# Patient Record
Sex: FEMALE | Race: OTHER | HISPANIC OR LATINO | Employment: FULL TIME | URBAN - METROPOLITAN AREA
[De-identification: names, ages, dates, MRNs, and addresses within clinical notes are randomized per-mention and may not be internally consistent; named-entity substitution may affect disease eponyms.]

---
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
## 2023-08-01 ENCOUNTER — HOSPITAL ENCOUNTER (EMERGENCY)
Facility: HOSPITAL | Age: 33
Discharge: HOME | End: 2023-08-02
Attending: INTERNAL MEDICINE | Admitting: EMERGENCY MEDICINE
Payer: COMMERCIAL

## 2023-08-01 ENCOUNTER — APPOINTMENT (EMERGENCY)
Dept: CT IMAGING | Facility: HOSPITAL | Age: 33
End: 2023-08-01
Payer: COMMERCIAL

## 2023-08-01 DIAGNOSIS — N20.9: Primary | ICD-10-CM

## 2023-08-01 DIAGNOSIS — R10.9 FLANK PAIN: ICD-10-CM

## 2023-08-01 LAB
ALBUMIN SERPL BCP-MCNC: 4.8 G/DL (ref 3.5–5)
ALP SERPL-CCNC: 49 U/L (ref 34–104)
ALT SERPL W P-5'-P-CCNC: 22 U/L (ref 7–52)
ANION GAP SERPL CALCULATED.3IONS-SCNC: 9 MMOL/L
AST SERPL W P-5'-P-CCNC: 16 U/L (ref 13–39)
BASOPHILS # BLD AUTO: 0.09 THOUSANDS/ÂΜL (ref 0–0.1)
BASOPHILS NFR BLD AUTO: 1 % (ref 0–1)
BILIRUB SERPL-MCNC: 0.24 MG/DL (ref 0.2–1)
BILIRUB UR QL STRIP: NEGATIVE
BUN SERPL-MCNC: 22 MG/DL (ref 5–25)
CALCIUM SERPL-MCNC: 9.9 MG/DL (ref 8.4–10.2)
CHLORIDE SERPL-SCNC: 104 MMOL/L (ref 96–108)
CLARITY UR: CLEAR
CO2 SERPL-SCNC: 25 MMOL/L (ref 21–32)
COLOR UR: YELLOW
CREAT SERPL-MCNC: 1 MG/DL (ref 0.6–1.3)
EOSINOPHIL # BLD AUTO: 0.16 THOUSAND/ÂΜL (ref 0–0.61)
EOSINOPHIL NFR BLD AUTO: 1 % (ref 0–6)
ERYTHROCYTE [DISTWIDTH] IN BLOOD BY AUTOMATED COUNT: 15.6 % (ref 11.6–15.1)
EXT PREGNANCY TEST URINE: NEGATIVE
EXT. CONTROL: NORMAL
GFR SERPL CREATININE-BSD FRML MDRD: 73 ML/MIN/1.73SQ M
GLUCOSE SERPL-MCNC: 80 MG/DL (ref 65–140)
GLUCOSE UR STRIP-MCNC: NEGATIVE MG/DL
HCT VFR BLD AUTO: 35.7 % (ref 34.8–46.1)
HGB BLD-MCNC: 10.6 G/DL (ref 11.5–15.4)
HGB UR QL STRIP.AUTO: NEGATIVE
IMM GRANULOCYTES # BLD AUTO: 0.04 THOUSAND/UL (ref 0–0.2)
IMM GRANULOCYTES NFR BLD AUTO: 0 % (ref 0–2)
KETONES UR STRIP-MCNC: NEGATIVE MG/DL
LEUKOCYTE ESTERASE UR QL STRIP: NEGATIVE
LYMPHOCYTES # BLD AUTO: 4.75 THOUSANDS/ÂΜL (ref 0.6–4.47)
LYMPHOCYTES NFR BLD AUTO: 37 % (ref 14–44)
MCH RBC QN AUTO: 23.8 PG (ref 26.8–34.3)
MCHC RBC AUTO-ENTMCNC: 29.7 G/DL (ref 31.4–37.4)
MCV RBC AUTO: 80 FL (ref 82–98)
MONOCYTES # BLD AUTO: 1.09 THOUSAND/ÂΜL (ref 0.17–1.22)
MONOCYTES NFR BLD AUTO: 8 % (ref 4–12)
NEUTROPHILS # BLD AUTO: 6.84 THOUSANDS/ÂΜL (ref 1.85–7.62)
NEUTS SEG NFR BLD AUTO: 53 % (ref 43–75)
NITRITE UR QL STRIP: NEGATIVE
NRBC BLD AUTO-RTO: 0 /100 WBCS
PH UR STRIP.AUTO: 5.5 [PH] (ref 4.5–8)
PLATELET # BLD AUTO: 302 THOUSANDS/UL (ref 149–390)
PMV BLD AUTO: 10.6 FL (ref 8.9–12.7)
POTASSIUM SERPL-SCNC: 4.1 MMOL/L (ref 3.5–5.3)
PROT SERPL-MCNC: 8.4 G/DL (ref 6.4–8.4)
PROT UR STRIP-MCNC: NEGATIVE MG/DL
RBC # BLD AUTO: 4.45 MILLION/UL (ref 3.81–5.12)
SODIUM SERPL-SCNC: 138 MMOL/L (ref 135–147)
SP GR UR STRIP.AUTO: 1.02 (ref 1–1.03)
UROBILINOGEN UR QL STRIP.AUTO: 0.2 E.U./DL
WBC # BLD AUTO: 12.97 THOUSAND/UL (ref 4.31–10.16)

## 2023-08-01 PROCEDURE — 81003 URINALYSIS AUTO W/O SCOPE: CPT

## 2023-08-01 PROCEDURE — 80053 COMPREHEN METABOLIC PANEL: CPT

## 2023-08-01 PROCEDURE — G1004 CDSM NDSC: HCPCS

## 2023-08-01 PROCEDURE — 74177 CT ABD & PELVIS W/CONTRAST: CPT

## 2023-08-01 PROCEDURE — 36415 COLL VENOUS BLD VENIPUNCTURE: CPT

## 2023-08-01 PROCEDURE — 99284 EMERGENCY DEPT VISIT MOD MDM: CPT

## 2023-08-01 PROCEDURE — 99284 EMERGENCY DEPT VISIT MOD MDM: CPT | Performed by: INTERNAL MEDICINE

## 2023-08-01 PROCEDURE — 96374 THER/PROPH/DIAG INJ IV PUSH: CPT

## 2023-08-01 PROCEDURE — 81025 URINE PREGNANCY TEST: CPT

## 2023-08-01 PROCEDURE — 85025 COMPLETE CBC W/AUTO DIFF WBC: CPT

## 2023-08-01 RX ORDER — KETOROLAC TROMETHAMINE 30 MG/ML
15 INJECTION, SOLUTION INTRAMUSCULAR; INTRAVENOUS ONCE
Status: COMPLETED | OUTPATIENT
Start: 2023-08-01 | End: 2023-08-01

## 2023-08-01 RX ADMIN — KETOROLAC TROMETHAMINE 15 MG: 30 INJECTION, SOLUTION INTRAMUSCULAR; INTRAVENOUS at 19:04

## 2023-08-01 RX ADMIN — IOHEXOL 100 ML: 350 INJECTION, SOLUTION INTRAVENOUS at 20:32

## 2023-08-01 NOTE — ED ATTENDING ATTESTATION
8/1/2023  I, Karlyne Burkitt, MD, saw and evaluated the patient. I have discussed the patient with the resident/non-physician practitioner and agree with the resident's/non-physician practitioner's findings, Plan of Care, and MDM as documented in the resident's/non-physician practitioner's note, except where noted. All available labs and Radiology studies were reviewed. I was present for key portions of any procedure(s) performed by the resident/non-physician practitioner and I was immediately available to provide assistance. At this point I agree with the current assessment done in the Emergency Department. I have conducted an independent evaluation of this patient a history and physical is as follows:    75-year-old woman with history hypertension presents to ED for left lower abdominal pain. She states radiates to the left flank. She had this pain 2 weeks ago and resolved restarted today. Associated with dysuria. No nausea, vomiting, diarrhea, melena or hematochezia. On exam the patient is awake, alert, and comfortable. Mucous membranes are moist.  Neck supple and nontender. The patient's heart is regular without murmurs, rubs, or gallops. Lungs are clear bilaterally with good air movement. Abdomen soft, no voluntary guarding, rebound or rigidity, TTP over LLQ. Moves all extremities. Patient neurologically nonfocal. No skin rashes. Back without deformities. Medical decision making: Differential diagnosis includes but not limited to: UTI, nephrolithiasis, pregnancy, ectopic pregnancy, viral syndrome, constipation, gerd, gastritis,  mesenteric ischemia, mesenteric adenitis, pancreatitis, cholecystitis, choledocholithiasis, hepatitis, bowel obstruction, ileus, gastroenteritis, colitis, malignancy, AAA, perforation, toxicologic poisoning, renal infarct, acute kidney injury, splenic infarct, splenic injury,muscular strain, intra-abdominal hematoma, hernia.   Patient will need UA, pregnancy test, blood work and CT abdomen pelvis.         ED Course  ED Course as of 08/01/23 1913   Tue Aug 01, 2023   1903 PREGNANCY TEST URINE: Negative   1903 Leukocytes, UA: Negative   1903 Nitrite, UA: Negative         Critical Care Time  Procedures

## 2023-08-01 NOTE — Clinical Note
Brittany Zhang was seen and treated in our emergency department on 8/1/2023. No restrictions            Diagnosis:     [facility]  . She may return on this date: 08/07/2023         If you have any questions or concerns, please don't hesitate to call.       Diane Navarro, DO    ______________________________           _______________          _______________  Hospital Representative                              Date                                Time

## 2023-08-01 NOTE — Clinical Note
Zoraida Joy was seen and treated in our emergency department on 8/1/2023. No restrictions            Diagnosis:     [place]  . She may return on this date: 08/04/2023         If you have any questions or concerns, please don't hesitate to call.       Lisa Horan, DO    ______________________________           _______________          _______________  Hospital Representative                              Date                                Time

## 2023-08-01 NOTE — Clinical Note
Sathya Shea was seen and treated in our emergency department on 8/1/2023. Diagnosis:     [facility]  . She may return on this date: If you have any questions or concerns, please don't hesitate to call.       Esther Craig MD    ______________________________           _______________          _______________  Hospital Representative                              Date                                Time

## 2023-08-01 NOTE — ED PROVIDER NOTES
History  Chief Complaint   Patient presents with   • Abdominal Pain     Lower abdominal pain that wraps around to her L lower back. Denies n/v/d. C/o urinary frequency and painful urination     78-year-old female patient with history of hypertension presenting with left lower abdominal pain radiating to left flank onset yesterday. Patient states that she had this pain 2 weeks ago that resolved. States pain came back. Patient states that pain worsens when she has to urinate and also has pain when she is urinating. Denies any fevers, chest pain, shortness of breath, nausea, vomiting, diarrhea. States does not think she is pregnant. Patient states that she took Tylenol without improvement of symptoms. Prior to Admission Medications   Prescriptions Last Dose Informant Patient Reported? Taking?   acetaminophen (TYLENOL) 325 mg tablet   No Yes   Sig: Take 2 tablets (650 mg total) by mouth every 6 (six) hours as needed for mild pain, headaches or fever   melatonin 3 mg Not Taking  No No   Sig: Take 1 tablet (3 mg total) by mouth daily at bedtime   Patient not taking: Reported on 2023   menthol-methyl salicylate (BENGAY) 66-66 % cream Not Taking  No No   Sig: Apply topically 4 (four) times a day as needed (back pain)   Patient not taking: Reported on 2023      Facility-Administered Medications: None       Past Medical History:   Diagnosis Date   • Hypertension        Past Surgical History:   Procedure Laterality Date   •  SECTION         History reviewed. No pertinent family history. I have reviewed and agree with the history as documented. E-Cigarette/Vaping     E-Cigarette/Vaping Substances     Social History     Tobacco Use   • Smoking status: Never   • Smokeless tobacco: Never   Substance Use Topics   • Alcohol use: Never   • Drug use: Never        Review of Systems   Gastrointestinal: Positive for abdominal pain. Genitourinary: Positive for dysuria and flank pain.    All other systems reviewed and are negative. Physical Exam  ED Triage Vitals   Temperature Pulse Respirations Blood Pressure SpO2   08/01/23 1821 08/01/23 1821 08/01/23 1821 08/01/23 1821 08/01/23 1821   98.2 °F (36.8 °C) 89 17 (!) 129/102 100 %      Temp Source Heart Rate Source Patient Position - Orthostatic VS BP Location FiO2 (%)   08/01/23 1821 08/01/23 1821 08/01/23 1821 08/01/23 1821 --   Oral Monitor Sitting Right arm       Pain Score       08/01/23 1904       8             Orthostatic Vital Signs  Vitals:    08/01/23 1821 08/01/23 1945 08/01/23 2225 08/02/23 0030   BP: (!) 129/102 137/85 134/92 116/74   Pulse: 89 86 80 70   Patient Position - Orthostatic VS: Sitting Lying Lying Lying       Physical Exam  Vitals reviewed. Constitutional:       Appearance: Normal appearance. HENT:      Head: Normocephalic and atraumatic. Nose: Nose normal.      Mouth/Throat:      Mouth: Mucous membranes are moist.      Pharynx: Oropharynx is clear. Eyes:      Extraocular Movements: Extraocular movements intact. Conjunctiva/sclera: Conjunctivae normal.   Cardiovascular:      Rate and Rhythm: Normal rate and regular rhythm. Pulses: Normal pulses. Heart sounds: Normal heart sounds. Pulmonary:      Effort: Pulmonary effort is normal.      Breath sounds: Normal breath sounds. Abdominal:      General: Bowel sounds are normal.      Palpations: Abdomen is soft. Tenderness: There is abdominal tenderness in the suprapubic area and left lower quadrant. Comments: Left flank pain   Musculoskeletal:         General: Normal range of motion. Cervical back: Normal range of motion. Skin:     General: Skin is warm and dry. Neurological:      General: No focal deficit present. Mental Status: She is alert and oriented to person, place, and time. Mental status is at baseline.          ED Medications  Medications   ketorolac (TORADOL) injection 15 mg (15 mg Intravenous Given 8/1/23 1904)   iohexol (OMNIPAQUE) 350 MG/ML injection (SINGLE-DOSE) 100 mL (100 mL Intravenous Given 8/1/23 2032)       Diagnostic Studies  Results Reviewed     Procedure Component Value Units Date/Time    Comprehensive metabolic panel [[id number]] Collected: 08/01/23 1905    Lab Status: Final result Specimen: Blood from Arm, Right Updated: 08/01/23 1928     Sodium 138 mmol/L      Potassium 4.1 mmol/L      Chloride 104 mmol/L      CO2 25 mmol/L      ANION GAP 9 mmol/L      BUN 22 mg/dL      Creatinine 1.00 mg/dL      Glucose 80 mg/dL      Calcium 9.9 mg/dL      AST 16 U/L      ALT 22 U/L      Alkaline Phosphatase 49 U/L      Total Protein 8.4 g/dL      Albumin 4.8 g/dL      Total Bilirubin 0.24 mg/dL      eGFR 73 ml/min/1.73sq m     Narrative:      [facility] guidelines for [facility] Kid[facility] Disease (CKD):   •  Stage 1 with normal or high GFR (GFR > 90 mL/min/1.73 square meters)  •  Stage 2 Mild CKD (GFR = 60-89 mL/min/1.73 square meters)  •  Stage 3A Moderate CKD (GFR = 45-59 mL/min/1.73 square meters)  •  Stage 3B Moderate CKD (GFR = 30-44 mL/min/1.73 square meters)  •  Stage 4 Severe CKD (GFR = 15-29 mL/min/1.73 square meters)  •  Stage 5 End Stage CKD (GFR <15 mL/min/1.73 square meters)  Note: GFR calculation is accurate only with a steady state creatinine    CBC and differential [[id number]]  (Abnormal) Collected: 08/01/23 1905    Lab Status: Final result Specimen: Blood from Arm, Right Updated: 08/01/23 1911     WBC 12.97 Thousand/uL      RBC 4.45 Million/uL      Hemoglobin 10.6 g/dL      Hematocrit 35.7 %      MCV 80 fL      MCH 23.8 pg      MCHC 29.7 g/dL      RDW 15.6 %      MPV 10.6 fL      Platelets 519 Thousands/uL      nRBC 0 /100 WBCs      Neutrophils Relative 53 %      Immat GRANS % 0 %      Lymphocytes Relative 37 %      Monocytes Relative 8 %      Eosinophils Relative 1 %      Basophils Relative 1 %      Neutrophils Absolute 6.84 Thousands/µL      Immature Grans Absolute 0.04 Thousand/uL Lymphocytes Absolute 4.75 Thousands/µL      Monocytes Absolute 1.09 Thousand/µL      Eosinophils Absolute 0.16 Thousand/µL      Basophils Absolute 0.09 Thousands/µL     POCT pregnancy, urine [[id number]]  (Normal) Resulted: 08/01/23 1902    Lab Status: Final result Updated: 08/01/23 1902     EXT Preg Test, Ur Negative     Control Valid    Urine Macroscopic, POC [[id number]] Collected: 08/01/23 1858    Lab Status: Final result Specimen: Urine Updated: 08/01/23 1900     Color, UA Yellow     Clarity, UA Clear     pH, UA 5.5     Leukocytes, UA Negative     Nitrite, UA Negative     Protein, UA Negative mg/dl      Glucose, UA Negative mg/dl      Ketones, UA Negative mg/dl      Urobilinogen, UA 0.2 E.U./dl      Bilirubin, UA Negative     Occult Blood, UA Negative     Specific Gravity, UA 1.025    Narrative:      CLINITEK RESULT                 CT abdomen pelvis with contrast   Final Result by Margarito Newell MD (08/02 6010)      Multiple phleboliths are present within the pelvis. Although the majority of these appear to be vascular, a tiny punctate distal ureteral calculus is difficult to entirely exclude. There is, however, no evidence of hydronephrosis bilaterally. Small hiatal hernia. No bowel obstruction. Normal appendix. There is a 5 mm peripherally oriented pulmonary nodule posteriorly within the right lower lobe of the lung. This was not definitely demonstrable on the July 11, 2021 examination. Based on current Fleischner Society 2017 Guidelines on incidental pulmonary    nodule, in this patient who is less than 39years of age, management decisions should be made on a case by case basis, keeping in mind that infectious causes are more likely than cancer and that use of serial CT should be minimized.          There is a significant additional finding or different interpretation from the [facility] preliminary report which was provided shortly after completion of the exam. The additional findings of right lower lobe pulmonary nodule and no    hydronephrosis will now be communicated with patient's clinical team by our radiology liaison. The study was marked in [facility] for immediate notification. Workstation performed: [id number]               Procedures  Procedures      ED Course                             SBIRT 20yo+    Flowsheet Row Most Recent Value   Initial Alcohol Screen: US AUDIT-C     1. How often do you have a drink containing alcohol? 0 Filed at: 08/01/2023 1853   2. How many drinks containing alcohol do you have on a typical day you are drinking? 0 Filed at: 08/01/2023 1853   3b. FEMALE Any Age, or MALE 65+: How often do you have 4 or more drinks on one occassion? 0 Filed at: 08/01/2023 1853   Audit-C Score 0 Filed at: 08/01/2023 1853   AN: How many times in the past year have you. .. Used an illegal drug or used a prescription medication for non-medical reasons? Never Filed at: 08/01/2023 1853                Medical Decision Making  38-year-old female patient presenting with left lower abdominal pain rating to left flank. States also having urinary symptoms. DDx includes pyelonephritis, cystitis, kidney stone. Labs show leukocytosis. Urine negative. CT abdomen pelvis pending. Patient treated with Toradol with improvement of symptoms. Signed out to Dr. Sascha Ohara pending CT read. Flank pain:     Details: Suspect kidney stones. Amount and/or Complexity of Data Reviewed  Labs: ordered. Radiology: ordered. Risk  Prescription drug management.             Disposition  Final diagnoses:   Urolith   Flank pain     Time reflects when diagnosis was documented in both MDM as applicable and the Disposition within this note     Time User Action Codes Description Comment    8/2/2023 12:29 AM Gwenevere Good Add [N20.9] Urolith     8/2/2023 12:29 AM Gwenevere Good Add [R10.9] Flank pain       ED Disposition     ED Disposition   Discharge    Condition   Stable    Date/Time   Wed Aug 2, 2023 12:30 AM    Comment   Zoraida Joy discharge to home/self care. Follow-up Information     Follow up With Specialties Details Why [id number] , PA-C Family Medicine Call in 2 days  [facility]  [address]  [phone]            Discharge Medication List as of 8/2/2023 12:51 AM      START taking these medications    Details   oxyCODONE (Roxicodone) 5 immediate release tablet Take 1 tablet (5 mg total) by mouth every 4 (four) hours as needed for moderate pain for up to 4 days Max Daily Amount: 30 mg, Starting Wed 8/2/2023, Until Sun 8/6/2023 at 2359, Normal         CONTINUE these medications which have NOT CHANGED    Details   acetaminophen (TYLENOL) 325 mg tablet Take 2 tablets (650 mg total) by mouth every 6 (six) hours as needed for mild pain, headaches or fever, Starting Wed 6/30/2021, Normal      melatonin 3 mg Take 1 tablet (3 mg total) by mouth daily at bedtime, Starting Sun 6/13/2021, Normal      menthol-methyl salicylate (BENGAY) 96-22 % cream Apply topically 4 (four) times a day as needed (back pain), Starting Sun 6/13/2021, Normal               PDMP Review     None           ED Provider  Attending physically available and evaluated Zoraida Joy. I managed the patient along with the ED Attending.     Electronically Signed by         Geraldyne Rubinstein, MD  08/02/23 6215

## 2023-08-02 VITALS
WEIGHT: 174.82 LBS | OXYGEN SATURATION: 99 % | RESPIRATION RATE: 18 BRPM | SYSTOLIC BLOOD PRESSURE: 116 MMHG | TEMPERATURE: 98.2 F | HEART RATE: 70 BPM | DIASTOLIC BLOOD PRESSURE: 74 MMHG

## 2023-08-02 RX ORDER — OXYCODONE HYDROCHLORIDE 5 MG/1
5 TABLET ORAL EVERY 4 HOURS PRN
Qty: 15 TABLET | Refills: 0 | Status: SHIPPED | OUTPATIENT
Start: 2023-08-02 | End: 2023-08-06

## 2023-08-02 NOTE — ED CARE HANDOFF
Emergency Department Sign Out Note        Sign out and transfer of care from Dr. Niru Ramsey. See Separate Emergency Department note. The patient, Jl Meredith, was evaluated by the previous provider for abdominal/flank pain. Workup Completed:  Labs urine CT    ED Course / Workup Pending (followup):  CT read                                   ED Course as of 08/02/23 0051   Tue Aug 01, 2023   2146 SO: L flank pain. CT pending. If normal can go. Uro f/u? Procedures  Medical Decision Making  CT read shows 2 mm ureterolith distal.  No other acute findings. Rest of patient's work-up unremarkable. Discussed results with patient. All questions answered. Set up for urology follow-up within the next week. We will send prescription for pain medication to the pharmacy. Return precautions given patient verbalized understanding    Amount and/or Complexity of Data Reviewed  Labs: ordered. Radiology: ordered. Risk  Prescription drug management. Disposition  Final diagnoses:   Urolith   Flank pain     Time reflects when diagnosis was documented in both MDM as applicable and the Disposition within this note     Time User Action Codes Description Comment    8/2/2023 12:29 AM Redia Apple Add [N20.9] Urolith     8/2/2023 12:29 AM Redia Apple Add [R10.9] Flank pain       ED Disposition     ED Disposition   Discharge    Condition   Stable    Date/Time   Wed Aug 2, 2023 12:30 AM    Comment   Jl Meredith discharge to home/self care.                Follow-up Information     Follow up With Specialties Details Why [id number] RICHARD Ar[facility] Family Medicine Call in 2 days  [address]  [facility] [address]          Patient's Medications   Discharge Prescriptions    OXYCODONE (ROXICODONE) 5 IMMEDIATE RELEASE TABLET    Take 1 tablet (5 mg total) by mouth every 4 (four) hours as needed for moderate pain for up to 4 days Max Daily Amount: 30 mg Start Date: 8/2/2023  End Date: 8/6/2023       Order Dose: 5 mg       Quantity: 15 tablet    Refills: 0            ED Provider  Electronically Signed by     Mark Gallegos DO  08/02/23 9498

## 2023-08-03 ENCOUNTER — OFFICE VISIT (OUTPATIENT)
Dept: FAMILY MEDICINE CLINIC | Facility: CLINIC | Age: 33
End: 2023-08-03

## 2023-08-03 VITALS
SYSTOLIC BLOOD PRESSURE: 120 MMHG | TEMPERATURE: 97.2 F | BODY MASS INDEX: 29.88 KG/M2 | WEIGHT: 175 LBS | RESPIRATION RATE: 16 BRPM | DIASTOLIC BLOOD PRESSURE: 70 MMHG | OXYGEN SATURATION: 99 % | HEIGHT: 64 IN | HEART RATE: 76 BPM

## 2023-08-03 DIAGNOSIS — R10.9 FLANK PAIN: ICD-10-CM

## 2023-08-03 DIAGNOSIS — R10.30 LOWER ABDOMINAL PAIN: Primary | ICD-10-CM

## 2023-08-03 DIAGNOSIS — R30.0 DYSURIA: ICD-10-CM

## 2023-08-03 DIAGNOSIS — R91.1 PULMONARY NODULE: ICD-10-CM

## 2023-08-03 PROCEDURE — 99213 OFFICE O/P EST LOW 20 MIN: CPT | Performed by: INTERNAL MEDICINE

## 2023-08-03 RX ORDER — PHENAZOPYRIDINE HYDROCHLORIDE 200 MG/1
200 TABLET, FILM COATED ORAL
Qty: 10 TABLET | Refills: 0 | Status: SHIPPED | OUTPATIENT
Start: 2023-08-03

## 2023-08-03 RX ORDER — IBUPROFEN 400 MG/1
600 TABLET ORAL EVERY 6 HOURS PRN
Qty: 30 TABLET | Refills: 1 | Status: SHIPPED | OUTPATIENT
Start: 2023-08-03

## 2023-08-03 NOTE — PATIENT INSTRUCTIONS
Pedras nos rins   O QUE VOCÊ PRECISA SABER:   Pedras nos rins se formam no sistema urinário quando há um desequilíbrio entre a quantidade de [place] e resíduos na sua urina. Quando isso acontece, certos tipos de cristais residuais se formam na urina. Os cristais se acumulam e formam pedras nos rins. Você pode ter mais de tammie pedra nos rins. INSTRUÇÕES APÓS A BUSTER:   Volte à emergência se:  Você estiver vomitando e não melhorar após ava medicamento. Ligue para o seu médico ou nefrologista se:  Você tiver febre. Você estiver com dificuldade para urinar. Você observar sangue na urina. Você sentir jaren intensa. Você tiver dúvidas ou preocupações relativas ao seu problema de saúde ou a whitney lidar com ed. Medicamentos: Você pode precisar de:  AINEs , whitney o ibuprofeno, ajudam a diminuir o inchaço, jaren e febre. Jes medicamento está disponível com ou bismark receita médica. Os AINEs podem provocar sangramento no estômago ou problemas renais em algumas pessoas. Se você estiver tomando um medicamento anticoagulante, sempre consulte o seu médico para saber se os SIRENA (medicamentos anti-inflamatórios não esteroides) são seguros para você. Sempre nini a bula do medicamento e siga as instruções. Acetaminofeno diminui jaren e febre. Ed está disponível bismark receita médica. Pergunte sobre a dose e a frequência com que deve ava o medicamento. Siga as instruções. Nini as bulas de todos os outros medicamentos que [employer] para hemanth se eles também contêm acetaminofeno ou consulte seu médico ou farmacêutico. O acetaminofeno pode causar danos ao fígado se não for tomado corretamente. Analgésicos de prescrição médica podem ser Mili Dunne. Pergunte ao seu médico whitney ava esse medicamento de forma [place]. Alguns analgésicos de prescrição contêm acetaminofeno.  Não tome outros medicamentos que contêm acetaminofeno (acetaminophen) bismark falar com seu médico. Acetaminofeno em excesso pode causar danos ao fígado. Analgésico de prescrição pode causar constipação. Pergunte ao [employer] prevenir ou tratar a constipação. Medicamentos para equilibrar seus eletrólitos podem ser necessários. [place] seu medicamento conforme orientado. Entre em contato com o seu médico se achar que o medicamento não está ajudando ou se você apresentar efeitos colaterais. Informe a ed se você for alérgico a qualquer medicamento. Mantenha tammie lista de todos os medicamentos, vitaminas e ervas (fitoterápicos) que você bonny. Inclua a quantidade, quando e por que os [place]. Leve a lista ou os frascos de comprimidos às consultas de acompanhamento. Leve sua lista de medicamentos consigo em kailee de emergência. O que você pode fazer para controlar pedras nos rins:  Elaine mais líquidos. Seu médico pode recomendar que você elaine pelo menos de oito a 12 xícaras (oito onças) de líquidos por josselin. Isso ajudará a eliminar as pedras nos rins pelo seu xixi. A água é o melhor líquido para beber. Filtre a urina sempre que for [employer]. Urine em tammie peneira ou um pedaço de pano patti para coletar as pedras. Leve as pedras ao seu médico para que possam ser enviadas ao laboratório para análise. Isso ajudará o médico a planejar o melhor tratamento para você. Pergunte se você deve evitar algum alimento. Pode ser necessário limitar o consumo de oxalatos. Oxalato é tammie substância química encontrada em alguns vegetais. O tipo mais comum de pedra nos rins é composto por cristais que contêm cálcio e oxalato. Se você tiver esse tipo de pedra nos rins com frequência, talvez seu médico ou nutricionista recomende que você limite o consumo de oxalatos. Pode ser necessário limitar a ingestão de sódio (sal) ou proteínas. Peça informações sobre os melhores alimentos para você. Pratique atividades físicas conforme orientado. As pedras podem passar mais facilmente se você permanecer ativo. As atividades físicas também podem ajudá-lo a controlar seu peso. Pergunte quais as melhores atividades para você. Após eliminar pedras nos rins: Seu médico pode  solicitar um teste de urina de 24 horas. Os resultados de um teste de urina de 24 horas ajudarão seu médico a planejar maneiras de prevenir a formação de mais pedras. Seu médico dará mais instruções. Faça acompanhamento com seu médico ou nefrologista conforme orientado: Anote as dúvidas que você tiver para se lembrar de perguntar sobre elas amarilis as consultas. © Copyright Nadeen Nix 2022 Information is for End User's use only and may not be sold, redistributed or otherwise used for commercial purposes. The above information is an  only. It is not intended as medical advice for individual conditions or treatments. Talk to your doctor, nurse or pharmacist before following any medical regimen to see if it is safe and effective for you.

## 2023-08-03 NOTE — ASSESSMENT & PLAN NOTE
5 mm peripherally oriented pulmonary nodule posteriorly within the right lower lobe of the lung noted on abdominal pelvis CT scan 08/01/2023.

## 2023-08-03 NOTE — ASSESSMENT & PLAN NOTE
Post ED follow-up visit for lower quadrant abdominal pain. Pain was initially intermittent but began constant and radiating to the lower back and left flank. Associated symptoms include dysuria. Patient was seen in the ED 8/1/2023 with similar complaints. Initial labs including UA, pregnancy test, CBC, CMP unremarkable. Abdominal CT concerning for tiny tiny punctate distal ureteral calculus and multiple phleboliths are present within the pelvis. Also turning fat-containing umbilical hernia noted on CT. Patient was treated with IV Toradol in the ED and discharged on oxycodone as needed. She reports moderate improvement and states the pain comes and go. Symptoms likely secondary to ureteric stone versus acute cystitis. Currently afebrile, and vitals within normal limits, PE notable for mild lower quadrant abdominal tenderness on palpation. Considering UA was negative in the ED we will continue further management with pain control.     Plan  -Encourage increase hydration  -Start Motrin 400 mg every 6 as needed  -Start Pyridium 200 mg 3 times daily  -Avoid all oxalic containing foods  -ED precautions discussed   -Follow-up with PCP as needed

## 2023-08-03 NOTE — PROGRESS NOTES
Name: Gabriela Castro      : 1990      MRN: [id number]  Encounter Provider: Erick June MD  Encounter Date: 8/3/2023   Encounter department: [address] [facility],[address]     1. Lower abdominal pain  Assessment & Plan:  Post ED follow-up visit for lower quadrant abdominal pain. Pain was initially intermittent but began constant and radiating to the lower back and left flank. Associated symptoms include dysuria. Patient was seen in the ED 2023 with similar complaints. Initial labs including UA, pregnancy test, CBC, CMP unremarkable. Abdominal CT concerning for tiny tiny punctate distal ureteral calculus and multiple phleboliths are present within the pelvis. Also turning fat-containing umbilical hernia noted on CT. Patient was treated with IV Toradol in the ED and discharged on oxycodone as needed. She reports moderate improvement and states the pain comes and go. Symptoms likely secondary to ureteric stone versus acute cystitis. Currently afebrile, and vitals within normal limits, PE notable for mild lower quadrant abdominal tenderness on palpation. Considering UA was negative in the ED we will continue further management with pain control. Plan  -Encourage increase hydration  -Start Motrin 400 mg every 6 as needed  -Start Pyridium 200 mg 3 times daily  -Avoid all oxalic containing foods  -ED precautions discussed   -Follow-up with PCP as needed        Orders:  -     ibuprofen (MOTRIN) 400 mg tablet; Take 1.5 tablets (600 mg total) by mouth every 6 (six) hours as needed for mild pain  -     phenazopyridine (PYRIDIUM) 200 mg tablet; Take 1 tablet (200 mg total) by mouth 3 (three) times a day with meals    2. Flank pain  -     ibuprofen (MOTRIN) 400 mg tablet; Take 1.5 tablets (600 mg total) by mouth every 6 (six) hours as needed for mild pain  -     phenazopyridine (PYRIDIUM) 200 mg tablet;  Take 1 tablet (200 mg total) by mouth 3 (three) times a day with meals    3. Dysuria  -     ibuprofen (MOTRIN) 400 mg tablet; Take 1.5 tablets (600 mg total) by mouth every 6 (six) hours as needed for mild pain  -     phenazopyridine (PYRIDIUM) 200 mg tablet; Take 1 tablet (200 mg total) by mouth 3 (three) times a day with meals    4. Pulmonary nodule  Assessment & Plan:  5 mm peripherally oriented pulmonary nodule posteriorly within the right lower lobe of the lung noted on abdominal pelvis CT scan 08/01/2023. BMI Counseling: Body mass index is 30.04 kg/m². The BMI is above normal. Nutrition recommendations include decreasing portion sizes, encouraging healthy choices of fruits and vegetables, decreasing fast food intake, consuming healthier snacks and limiting drinks that contain sugar. Exercise recommendations include moderate physical activity 150 minutes/week, vigorous physical activity 75 minutes/week and exercising 3-5 times per week. No pharmacotherapy was ordered. Patient referred to PCP. Rationale for BMI follow-up plan is due to patient being overweight or obese. Subjective     Patient is a 26years old female with a past medical history of hypertension who presents to the office for follow-up visit post ED for lower quadrant abdominal pain. She reports the pain has been present for more than 3 days. It was sudden onset, initially intermittent, sharp radiating to the lower back and left flank. Associated with dysuria. Initial labs in the ED including UA, pregnancy test, CBC CMP unremarkable. Abdominal CT concerning for tiny tiny punctate distal ureteral calculus and multiple phleboliths are present within the pelvis. Also turning fat-containing umbilical hernia noted on CT. A 5 mm peripherally oriented pulmonary nodule posteriorly within the right lower lobe of the lung. Denies fever, chills nausea/vomiting/hematuria. HPI  Review of Systems   Constitutional: Negative for chills and fever.    HENT: Negative for ear pain and sore throat. Eyes: Negative for pain and visual disturbance. Respiratory: Negative for cough and shortness of breath. Cardiovascular: Negative for chest pain and palpitations. Gastrointestinal: Negative for abdominal pain and vomiting. Genitourinary: Negative for dysuria and hematuria. Musculoskeletal: Negative for arthralgias and back pain. Skin: Negative for color change and rash. Neurological: Negative for seizures and syncope. Psychiatric/Behavioral: Negative for sleep disturbance and suicidal ideas. All other systems reviewed and are negative.       Current Outpatient Medications on File Prior to Visit   Medication Sig   • acetaminophen (TYLENOL) 325 mg tablet Take 2 tablets (650 mg total) by mouth every 6 (six) hours as needed for mild pain, headaches or fever   • melatonin 3 mg Take 1 tablet (3 mg total) by mouth daily at bedtime (Patient not taking: Reported on 8/1/2023)   • menthol-methyl salicylate (BENGAY) 11-70 % cream Apply topically 4 (four) times a day as needed (back pain) (Patient not taking: Reported on 8/1/2023)   • oxyCODONE (Roxicodone) 5 immediate release tablet Take 1 tablet (5 mg total) by mouth every 4 (four) hours as needed for moderate pain for up to 4 days Max Daily Amount: 30 mg      Social Connections: Not on file     Social History     Socioeconomic History   • Marital status: /Civil Union     Spouse name: Not on file   • Number of children: Not on file   • Years of education: Not on file   • Highest education level: Not on file   Occupational History   • Not on file   Tobacco Use   • Smoking status: Never   • Smokeless tobacco: Never   Substance and Sexual Activity   • Alcohol use: Never   • Drug use: Never   • Sexual activity: Not on file   Other Topics Concern   • Not on file   Social History Narrative   • Not on file     Social Determinants of Health     Financial Resource Strain: Not on file   Food Insecurity: Not on file   Transportation Needs: Not on file   Physical Activity: Not on file   Stress: Not on file   Social Connections: Not on file   Intimate Partner Violence: Not on file   Housing Stability: Not on file     No Known Allergies  History reviewed. No pertinent family history. Past Medical History:   Diagnosis Date   • Hypertension      Past Surgical History:   Procedure Laterality Date   •  SECTION         Objective     /70 (BP Location: Left arm, Patient Position: Sitting, Cuff Size: Standard)   Pulse 76   Temp (!) 97.2 °F (36.2 °C) (Temporal)   Resp 16   Ht 5' 4" (1.626 m)   Wt 79.4 kg (175 lb)   SpO2 99%   BMI 30.04 kg/m²     Physical Exam  Constitutional:       Appearance: Normal appearance. HENT:      Head: Normocephalic and atraumatic. Right Ear: External ear normal.      Left Ear: External ear normal.      Nose: No congestion. Mouth/Throat:      Mouth: Mucous membranes are moist.   Eyes:      Extraocular Movements: Extraocular movements intact. Pupils: Pupils are equal, round, and reactive to light. Cardiovascular:      Rate and Rhythm: Normal rate and regular rhythm. Pulses: Normal pulses. Heart sounds: Normal heart sounds. No murmur heard. No friction rub. No gallop. Pulmonary:      Effort: Pulmonary effort is normal. No respiratory distress. Breath sounds: Normal breath sounds. No stridor. No wheezing or rhonchi. Chest:      Chest wall: No tenderness. Abdominal:      General: Bowel sounds are normal. There is no distension. Palpations: Abdomen is soft. Tenderness: There is no abdominal tenderness. There is no right CVA tenderness, left CVA tenderness or guarding. Musculoskeletal:         General: Tenderness present. No swelling. Normal range of motion. Cervical back: Neck supple. Right lower leg: No edema. Left lower leg: No edema. Comments: Mild lower quadrant abdominal tenderness on palpation. No CVA tenderness bilaterally.    Skin: General: Skin is warm and dry. Capillary Refill: Capillary refill takes less than 2 seconds. Findings: No lesion or rash. Neurological:      General: No focal deficit present. Mental Status: She is alert and oriented to person, place, and time.    Psychiatric:         Mood and Affect: Mood normal.       Hyun Marquez MD

## 2023-08-15 ENCOUNTER — TELEPHONE (OUTPATIENT)
Dept: FAMILY MEDICINE CLINIC | Facility: CLINIC | Age: 33
End: 2023-08-15

## 2023-08-15 DIAGNOSIS — R91.1 PULMONARY NODULE: Primary | ICD-10-CM

## 2023-08-15 NOTE — TELEPHONE ENCOUNTER
Spoke with referral team.  [facility] order for a full chest CT placed by ER, so our referral team never received order. Placed new order.

## 2024-04-13 ENCOUNTER — TELEPHONE (OUTPATIENT)
Dept: OTHER | Facility: OTHER | Age: 34
End: 2024-04-13

## 2024-04-13 ENCOUNTER — HOSPITAL ENCOUNTER (EMERGENCY)
Facility: HOSPITAL | Age: 34
Discharge: HOME | End: 2024-04-13
Attending: EMERGENCY MEDICINE | Admitting: EMERGENCY MEDICINE
Payer: COMMERCIAL

## 2024-04-13 VITALS
SYSTOLIC BLOOD PRESSURE: 127 MMHG | DIASTOLIC BLOOD PRESSURE: 97 MMHG | RESPIRATION RATE: 18 BRPM | OXYGEN SATURATION: 99 % | WEIGHT: 174.8 LBS | TEMPERATURE: 98.9 F | BODY MASS INDEX: 30 KG/M2 | HEART RATE: 87 BPM

## 2024-04-13 DIAGNOSIS — I10 HTN (HYPERTENSION): Primary | ICD-10-CM

## 2024-04-13 LAB
ANION GAP SERPL CALCULATED.3IONS-SCNC: 8 MMOL/L (ref 4–13)
ATRIAL RATE: 83 BPM
BASOPHILS # BLD AUTO: 0.05 THOUSANDS/ÂΜL (ref 0–0.1)
BASOPHILS NFR BLD AUTO: 1 % (ref 0–1)
BUN SERPL-MCNC: 17 MG/DL (ref 5–25)
CALCIUM SERPL-MCNC: 9 MG/DL (ref 8.4–10.2)
CHLORIDE SERPL-SCNC: 103 MMOL/L (ref 96–108)
CO2 SERPL-SCNC: 26 MMOL/L (ref 21–32)
CREAT SERPL-MCNC: 1.01 MG/DL (ref 0.6–1.3)
EOSINOPHIL # BLD AUTO: 0.32 THOUSAND/ÂΜL (ref 0–0.61)
EOSINOPHIL NFR BLD AUTO: 5 % (ref 0–6)
ERYTHROCYTE [DISTWIDTH] IN BLOOD BY AUTOMATED COUNT: 13.4 % (ref 11.6–15.1)
EXT PREGNANCY TEST URINE: NEGATIVE
EXT. CONTROL: NORMAL
GFR SERPL CREATININE-BSD FRML MDRD: 72 ML/MIN/1.73SQ M
GLUCOSE SERPL-MCNC: 80 MG/DL (ref 65–140)
HCT VFR BLD AUTO: 38.2 % (ref 34.8–46.1)
HGB BLD-MCNC: 12.4 G/DL (ref 11.5–15.4)
IMM GRANULOCYTES # BLD AUTO: 0.01 THOUSAND/UL (ref 0–0.2)
IMM GRANULOCYTES NFR BLD AUTO: 0 % (ref 0–2)
LYMPHOCYTES # BLD AUTO: 2.43 THOUSANDS/ÂΜL (ref 0.6–4.47)
LYMPHOCYTES NFR BLD AUTO: 34 % (ref 14–44)
MCH RBC QN AUTO: 27.9 PG (ref 26.8–34.3)
MCHC RBC AUTO-ENTMCNC: 32.5 G/DL (ref 31.4–37.4)
MCV RBC AUTO: 86 FL (ref 82–98)
MONOCYTES # BLD AUTO: 0.63 THOUSAND/ÂΜL (ref 0.17–1.22)
MONOCYTES NFR BLD AUTO: 9 % (ref 4–12)
NEUTROPHILS # BLD AUTO: 3.63 THOUSANDS/ÂΜL (ref 1.85–7.62)
NEUTS SEG NFR BLD AUTO: 51 % (ref 43–75)
NRBC BLD AUTO-RTO: 0 /100 WBCS
P AXIS: 15 DEGREES
PLATELET # BLD AUTO: 277 THOUSANDS/UL (ref 149–390)
PMV BLD AUTO: 11 FL (ref 8.9–12.7)
POTASSIUM SERPL-SCNC: 4 MMOL/L (ref 3.5–5.3)
PR INTERVAL: 112 MS
QRS AXIS: 68 DEGREES
QRSD INTERVAL: 88 MS
QT INTERVAL: 366 MS
QTC INTERVAL: 430 MS
RBC # BLD AUTO: 4.45 MILLION/UL (ref 3.81–5.12)
SODIUM SERPL-SCNC: 137 MMOL/L (ref 135–147)
T WAVE AXIS: 36 DEGREES
VENTRICULAR RATE: 83 BPM
WBC # BLD AUTO: 7.07 THOUSAND/UL (ref 4.31–10.16)

## 2024-04-13 PROCEDURE — 99284 EMERGENCY DEPT VISIT MOD MDM: CPT | Performed by: EMERGENCY MEDICINE

## 2024-04-13 PROCEDURE — 36415 COLL VENOUS BLD VENIPUNCTURE: CPT | Performed by: EMERGENCY MEDICINE

## 2024-04-13 PROCEDURE — 99284 EMERGENCY DEPT VISIT MOD MDM: CPT

## 2024-04-13 PROCEDURE — 80048 BASIC METABOLIC PNL TOTAL CA: CPT | Performed by: EMERGENCY MEDICINE

## 2024-04-13 PROCEDURE — 93010 ELECTROCARDIOGRAM REPORT: CPT

## 2024-04-13 PROCEDURE — 81025 URINE PREGNANCY TEST: CPT | Performed by: EMERGENCY MEDICINE

## 2024-04-13 PROCEDURE — 85025 COMPLETE CBC W/AUTO DIFF WBC: CPT | Performed by: EMERGENCY MEDICINE

## 2024-04-13 PROCEDURE — 96374 THER/PROPH/DIAG INJ IV PUSH: CPT

## 2024-04-13 PROCEDURE — 93005 ELECTROCARDIOGRAM TRACING: CPT

## 2024-04-13 RX ORDER — KETOROLAC TROMETHAMINE 30 MG/ML
15 INJECTION, SOLUTION INTRAMUSCULAR; INTRAVENOUS ONCE
Status: COMPLETED | OUTPATIENT
Start: 2024-04-13 | End: 2024-04-13

## 2024-04-13 RX ORDER — AMLODIPINE BESYLATE 10 MG/1
5 TABLET ORAL DAILY
Qty: 15 TABLET | Refills: 0 | Status: SHIPPED | OUTPATIENT
Start: 2024-04-13 | End: 2024-05-13

## 2024-04-13 RX ADMIN — KETOROLAC TROMETHAMINE 15 MG: 30 INJECTION, SOLUTION INTRAMUSCULAR; INTRAVENOUS at 10:54

## 2024-04-13 NOTE — ED PROVIDER NOTES
History  Chief Complaint   Patient presents with    Medical Problem     Pt states that she was in [place] and her blood pressure was elevated, she was given a beta blocker in [place] for her blood pressure. She also states she is having a headache and neck pain on the left side since Wednesday. Took medication for pain on Wednesday but the pain returned today.      Patient is a 33-year-old female who presents with 2 complaints.  1.  Started with left sided post neck pain 4 days ago.  Radiates up to head.  Took an excedrin on Wednesday which helped.  Denies any HT or injury.  No numbness/tingling. H/o similar pain the past.  2.  States was in [place], saw an GYN, told BP was high.  Then saw a cardiologist who put her on 2.5 mg of a beta blocker daily until she came back to the [place] to see a cardiology.  H/o preeclampsia.  But no issues with bp since birth of child.  No cp,sob,n/v.          Prior to Admission Medications   Prescriptions Last Dose Informant Patient Reported? Taking?   acetaminophen (TYLENOL) 325 mg tablet   No No   Sig: Take 2 tablets (650 mg total) by mouth every 6 (six) hours as needed for mild pain, headaches or fever   ibuprofen (MOTRIN) 400 mg tablet   No No   Sig: Take 1.5 tablets (600 mg total) by mouth every 6 (six) hours as needed for mild pain   melatonin 3 mg   No No   Sig: Take 1 tablet (3 mg total) by mouth daily at bedtime   Patient not taking: Reported on 2023   menthol-methyl salicylate (BENGAY) 10-15 % cream   No No   Sig: Apply topically 4 (four) times a day as needed (back pain)   Patient not taking: Reported on 2023   phenazopyridine (PYRIDIUM) 200 mg tablet   No No   Sig: Take 1 tablet (200 mg total) by mouth 3 (three) times a day with meals      Facility-Administered Medications: None       Past Medical History:   Diagnosis Date    Hypertension        Past Surgical History:   Procedure Laterality Date     SECTION         History reviewed. No pertinent family history.  I have  reviewed and agree with the history as documented.    E-Cigarette/Vaping     E-Cigarette/Vaping Substances     Social History     Tobacco Use    Smoking status: Never    Smokeless tobacco: Never   Substance Use Topics    Alcohol use: Never    Drug use: Never       Review of Systems   Constitutional: Negative.    HENT: Negative.     Eyes: Negative.    Respiratory: Negative.     Cardiovascular: Negative.    Gastrointestinal: Negative.    Endocrine: Negative.    Genitourinary: Negative.    Musculoskeletal:  Positive for myalgias and neck pain.   Skin: Negative.    Allergic/Immunologic: Negative.    Neurological: Negative.    Hematological: Negative.    Psychiatric/Behavioral: Negative.     All other systems reviewed and are negative.      Physical Exam  Physical Exam  Vitals and nursing note reviewed.   Constitutional:       Appearance: Normal appearance. She is normal weight.   HENT:      Head: Normocephalic and atraumatic.      Right Ear: Tympanic membrane, ear canal and external ear normal.      Left Ear: Tympanic membrane, ear canal and external ear normal.      Nose: Nose normal.      Mouth/Throat:      Mouth: Mucous membranes are moist.      Pharynx: Oropharynx is clear.   Eyes:      Extraocular Movements: Extraocular movements intact.      Conjunctiva/sclera: Conjunctivae normal.      Pupils: Pupils are equal, round, and reactive to light.   Neck:      Comments: Post left neck ttp.  No midline ttp. No stepoff or deformity.   Cardiovascular:      Rate and Rhythm: Normal rate and regular rhythm.      Pulses: Normal pulses.      Heart sounds: Normal heart sounds.   Pulmonary:      Effort: Pulmonary effort is normal.      Breath sounds: Normal breath sounds.   Abdominal:      General: Bowel sounds are normal.      Palpations: Abdomen is soft.   Musculoskeletal:         General: Normal range of motion.      Cervical back: Normal range of motion. Tenderness present.   Skin:     General: Skin is warm and dry.       Capillary Refill: Capillary refill takes less than 2 seconds.   Neurological:      General: No focal deficit present.      Mental Status: She is alert and oriented to person, place, and time.      Sensory: No sensory deficit.      Motor: No weakness.   Psychiatric:         Mood and Affect: Mood normal.         Behavior: Behavior normal.         Vital Signs  ED Triage Vitals   Temperature Pulse Respirations Blood Pressure SpO2   04/13/24 1010 04/13/24 1010 04/13/24 1010 04/13/24 1010 04/13/24 1010   98.9 °F (37.2 °C) 81 18 (!) 151/112 99 %      Temp Source Heart Rate Source Patient Position - Orthostatic VS BP Location FiO2 (%)   04/13/24 1010 04/13/24 1010 04/13/24 1010 04/13/24 1010 --   Oral Monitor Sitting Left arm       Pain Score       04/13/24 1054       10 - Worst Possible Pain           Vitals:    04/13/24 1010 04/13/24 1150   BP: (!) 151/112 127/97   Pulse: 81 87   Patient Position - Orthostatic VS: Sitting Sitting         Visual Acuity      ED Medications  Medications   ketorolac (TORADOL) injection 15 mg (15 mg Intravenous Given 4/13/24 1054)       Diagnostic Studies  Results Reviewed       Procedure Component Value Units Date/Time    Basic metabolic panel [[id number]] Collected: 04/13/24 1057    Lab Status: Final result Specimen: Blood from Arm, Right Updated: 04/13/24 1118     Sodium 137 mmol/L      Potassium 4.0 mmol/L      Chloride 103 mmol/L      CO2 26 mmol/L      ANION GAP 8 mmol/L      BUN 17 mg/dL      Creatinine 1.01 mg/dL      Glucose 80 mg/dL      Calcium 9.0 mg/dL      eGFR 72 ml/min/1.73sq m     Narrative:      National Kidney Disease Foundation guidelines for Chronic Kidney Disease (CKD):     Stage 1 with normal or high GFR (GFR > 90 mL/min/1.73 square meters)    Stage 2 Mild CKD (GFR = 60-89 mL/min/1.73 square meters)    Stage 3A Moderate CKD (GFR = 45-59 mL/min/1.73 square meters)    Stage 3B Moderate CKD (GFR = 30-44 mL/min/1.73 square meters)    Stage 4 Severe CKD (GFR = 15-29  mL/min/1.73 square meters)    Stage 5 End Stage CKD (GFR <15 mL/min/1.73 square meters)  Note: GFR calculation is accurate only with a steady state creatinine    CBC and differential [[id number]] Collected: 04/13/24 1057    Lab Status: Final result Specimen: Blood from Arm, Right Updated: 04/13/24 1104     WBC 7.07 Thousand/uL      RBC 4.45 Million/uL      Hemoglobin 12.4 g/dL      Hematocrit 38.2 %      MCV 86 fL      MCH 27.9 pg      MCHC 32.5 g/dL      RDW 13.4 %      MPV 11.0 fL      Platelets 277 Thousands/uL      nRBC 0 /100 WBCs      Segmented % 51 %      Immature Grans % 0 %      Lymphocytes % 34 %      Monocytes % 9 %      Eosinophils Relative 5 %      Basophils Relative 1 %      Absolute Neutrophils 3.63 Thousands/µL      Absolute Immature Grans 0.01 Thousand/uL      Absolute Lymphocytes 2.43 Thousands/µL      Absolute Monocytes 0.63 Thousand/µL      Eosinophils Absolute 0.32 Thousand/µL      Basophils Absolute 0.05 Thousands/µL     POCT pregnancy, urine [[id number]]  (Normal) Resulted: 04/13/24 1040    Lab Status: Final result Updated: 04/13/24 1057     EXT Preg Test, Ur Negative     Control Valid                   No orders to display              Procedures  Procedures         ED Course  ED Course as of 04/14/24 1159   Sat Apr 13, 2024   1131 Feeling improved.  Went over results. Will start on different bp med at this time.  Follow up with cards/pcp.  RTED for any concern.                                SBIRT 20yo+      Flowsheet Row Most Recent Value   Initial Alcohol Screen: US AUDIT-C     1. How often do you have a drink containing alcohol? 0 Filed at: 04/13/2024 1015   2. How many drinks containing alcohol do you have on a typical day you are drinking?  0 Filed at: 04/13/2024 1015   3b. FEMALE Any Age, or MALE 65+: How often do you have 4 or more drinks on one occassion? 0 Filed at: 04/13/2024 1015   Audit-C Score 0 Filed at: 04/13/2024 1015   AN: How many times in the past year have you...    Used  an illegal drug or used a prescription medication for non-medical reasons? Never Filed at: 04/13/2024 1015                      Medical Decision Making  Problems Addressed:  HTN (hypertension): acute illness or injury     Details: Bp elevated. Ecg, labw wnl.  Placed on different bp med.  Follow up with PCP.      Amount and/or Complexity of Data Reviewed  Labs: ordered.    Risk  Prescription drug management.             Disposition  Final diagnoses:   HTN (hypertension)     Time reflects when diagnosis was documented in both MDM as applicable and the Disposition within this note       Time User Action Codes Description Comment    4/13/2024 11:32 AM Oskar Mueller Add [I10] HTN (hypertension)           ED Disposition       ED Disposition   Discharge    Condition   Stable    Date/Time   Sat Apr 13, 2024 1132    Comment   Ish Fan discharge to home/self care.                   Follow-up Information       Follow up With Specialties Details Why Contact Info Additional Information    Tami Monroy PA-C Family Medicine   [address] [place] [id number]  [phone]       [facility]   [address] [place] [phone]  [phone] [facility], [address] [place] [address], [phone]    [phone]            Discharge Medication List as of 4/13/2024 11:33 AM        START taking these medications    Details   amLODIPine (NORVASC) 10 mg tablet Take 0.5 tablets (5 mg total) by mouth daily, Starting Sat 4/13/2024, Until Mon 5/13/2024, Normal           CONTINUE these medications which have NOT CHANGED    Details   acetaminophen (TYLENOL) 325 mg tablet Take 2 tablets (650 mg total) by mouth every 6 (six) hours as needed for mild pain, headaches or fever, Starting Wed 6/30/2021, Normal      melatonin 3 mg Take 1 tablet (3 mg total) by mouth daily at bedtime, Starting Sun 6/13/2021, Normal      menthol-methyl  salicylate (BENGAY) 10-15 % cream Apply topically 4 (four) times a day as needed (back pain), Starting Sun 6/13/2021, Normal      phenazopyridine (PYRIDIUM) 200 mg tablet Take 1 tablet (200 mg total) by mouth 3 (three) times a day with meals, Starting Thu 8/3/2023, Normal           STOP taking these medications       ibuprofen (MOTRIN) 400 mg tablet Comments:   Reason for Stopping:               No discharge procedures on file.    PDMP Review       None            ED Provider  Electronically Signed by             Oskar Mueller MD  04/14/24 8315

## 2024-04-13 NOTE — ED PROCEDURE NOTE
PROCEDURE  ECG 12 Lead Documentation Only    Date/Time: 4/13/2024 10:36 AM    Performed by: Oskar Mueller MD  Authorized by: Oskar Mueller MD    Indications / Diagnosis:  HTN  ECG reviewed by me, the ED Provider: yes    Patient location:  ED  Interpretation:     Interpretation: normal    Rate:     ECG rate:  83    ECG rate assessment: normal    Rhythm:     Rhythm: sinus rhythm    Ectopy:     Ectopy: none    QRS:     QRS axis:  Normal    QRS intervals:  Normal  Conduction:     Conduction: normal    ST segments:     ST segments:  Normal  T waves:     T waves: normal         Oskar Mueller MD  04/13/24 1037

## 2024-04-15 ENCOUNTER — TELEPHONE (OUTPATIENT)
Dept: CARDIOLOGY CLINIC | Facility: CLINIC | Age: 34
End: 2024-04-15

## 2024-04-23 LAB
ATRIAL RATE: 83 BPM
P AXIS: 15 DEGREES
PR INTERVAL: 112 MS
QRS AXIS: 68 DEGREES
QRSD INTERVAL: 88 MS
QT INTERVAL: 366 MS
QTC INTERVAL: 430 MS
T WAVE AXIS: 36 DEGREES
VENTRICULAR RATE: 83 BPM

## 2024-04-23 PROCEDURE — 93010 ELECTROCARDIOGRAM REPORT: CPT
